# Patient Record
Sex: FEMALE | Race: OTHER | NOT HISPANIC OR LATINO | URBAN - METROPOLITAN AREA
[De-identification: names, ages, dates, MRNs, and addresses within clinical notes are randomized per-mention and may not be internally consistent; named-entity substitution may affect disease eponyms.]

---

## 2021-03-26 ENCOUNTER — EMERGENCY (EMERGENCY)
Facility: HOSPITAL | Age: 66
LOS: 1 days | Discharge: HOME | End: 2021-03-26
Attending: STUDENT IN AN ORGANIZED HEALTH CARE EDUCATION/TRAINING PROGRAM
Payer: COMMERCIAL

## 2021-03-26 VITALS
OXYGEN SATURATION: 100 % | HEIGHT: 65 IN | DIASTOLIC BLOOD PRESSURE: 74 MMHG | HEART RATE: 63 BPM | SYSTOLIC BLOOD PRESSURE: 157 MMHG | WEIGHT: 106.04 LBS | RESPIRATION RATE: 18 BRPM | TEMPERATURE: 98 F

## 2021-03-26 VITALS
HEART RATE: 63 BPM | RESPIRATION RATE: 18 BRPM | TEMPERATURE: 98 F | OXYGEN SATURATION: 98 % | DIASTOLIC BLOOD PRESSURE: 81 MMHG | SYSTOLIC BLOOD PRESSURE: 137 MMHG

## 2021-03-26 PROCEDURE — 73110 X-RAY EXAM OF WRIST: CPT

## 2021-03-26 PROCEDURE — 25605 CLTX DST RDL FX/EPHYS SEP W/: CPT | Mod: RT

## 2021-03-26 PROCEDURE — 73090 X-RAY EXAM OF FOREARM: CPT

## 2021-03-26 PROCEDURE — 99285 EMERGENCY DEPT VISIT HI MDM: CPT | Mod: 25

## 2021-03-26 PROCEDURE — 73110 X-RAY EXAM OF WRIST: CPT | Mod: 26,RT

## 2021-03-26 PROCEDURE — 99284 EMERGENCY DEPT VISIT MOD MDM: CPT

## 2021-03-26 PROCEDURE — 73130 X-RAY EXAM OF HAND: CPT | Mod: 26,RT

## 2021-03-26 PROCEDURE — 73090 X-RAY EXAM OF FOREARM: CPT | Mod: 26,LT

## 2021-03-26 PROCEDURE — 73130 X-RAY EXAM OF HAND: CPT

## 2021-03-26 RX ORDER — LIDOCAINE HYDROCHLORIDE AND EPINEPHRINE 10; 10 MG/ML; UG/ML
5 INJECTION, SOLUTION INFILTRATION; PERINEURAL ONCE
Refills: 0 | Status: COMPLETED | OUTPATIENT
Start: 2021-03-26 | End: 2021-03-26

## 2021-03-26 RX ADMIN — LIDOCAINE HYDROCHLORIDE AND EPINEPHRINE 5 MILLILITER(S): 10; 10 INJECTION, SOLUTION INFILTRATION; PERINEURAL at 23:35

## 2021-03-26 NOTE — ED PROVIDER NOTE - PATIENT PORTAL LINK FT
You can access the FollowMyHealth Patient Portal offered by [facility] by registering at the following website: [email or web address]. By joining [facility]’s FollowMyHealth portal, you will also be able to view your health information using other applications (apps) compatible with our system.

## 2021-03-26 NOTE — ED PROVIDER NOTE - OBJECTIVE STATEMENT
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 decided to come to the er due to persistent wrist pain.

## 2021-03-26 NOTE — ED PROVIDER NOTE - PHYSICAL EXAMINATION
I have reviewed the triage vital signs.  Const: Well-nourished, Well-developed, appearing stated age  Head: atraumatic, normocephalic  Eyes: no conjunctival injection and no scleral icterus  ENMT: Atraumatic external nose and ears, Moist mucus membranes  Neck: Symmetric, trachea midline,  CVS: +S1/S2, dorsalis pedis/radial pulse 2+ bilaterally, intact capillary refill in all 10 fingers,   RESP: Unlabored respiratory effort, Clear to auscultation bilaterally  GI: Nontender/Nondistended, soft abdomen  MSK: swelling over the R wrist, normal elbow flexion and extension, ambulatory in the department no pain in the b/l lower extremities,   Skin: Warm, Dry w/ no rashes bruising over the R wrist   Neuro: GCS=15, pain and swelling in the R wrist,  Psych: Awake, Alert, & Orientedx3;  Appropriate mood and affect, cooperative

## 2021-03-26 NOTE — ED PROVIDER NOTE - NSFOLLOWUPINSTRUCTIONS_ED_ALL_ED_FT
We evaluated you in the emergency room and it appears that you have a distal radius fracture.     We recommend that you rest, ice and take tylenol(650 mg up to three times a day)/motrin(600 mg up to three times a day) for your symptoms.       If you develop numbness, weakness, change in color of your extremities (turn blue or white), worsening pain please seek immediate medical care for repeat evaluation.

## 2021-03-26 NOTE — CONSULT NOTE ADULT - SUBJECTIVE AND OBJECTIVE BOX
66yFemale c/o R wrist pain s/p mechanical fall forward while walking up stairs today. Patient denies head hit or LOC. Patient denies numbness or tingling in the RUE. Patient denies any other injuries.    PMH:    PSH:    AH:    Meds: See med rec    T(C): 36.7 (03-26-21 @ 21:12)  HR: 67 (03-26-21 @ 21:12)  BP: 164/85 (03-26-21 @ 21:12)  RR: 18 (03-26-21 @ 21:12)  SpO2: 100% (03-26-21 @ 21:12)  Wt(kg): --    Gen: NAD  PE RUE:  Skin intact,   visible deformity of wrist,   SILT med/rad/ulnar/axillary  +AIN/PIN/Ulnar/Radial/Musc/Median,   +shoulder/elbow ROM,   wrist ROM limited 2/2 pain,   +radial pulse, soft compartments,    Secondary:  No TTP over bony landmarks, SILT BL, ROM intact BL, distal pulses palpable.    Imaging:  XR demonstrating R distal radius fracture    Procedure:  Under aseptic conditions, a hematoma block was administered to the fracture site using 10cc of 1% lidocaine. Closed reduction was performed and a well molded plaster splint was applied. The patient tolerated the procedure well and there we no complications. The patient was neurovascularly intact following reduction. Post-reduction xrays demonstrated acceptable alignment.      A/P: 66 yoF  s/p reduction of R distal radius fracture    - Pain control  - NWB RUE in splint  - Splint precautions  - Possible need for future surgery explained to the patient  - Fu with Dr. Covarrubias on Monday. Please call office to schedule an appointment. [phone]

## 2021-03-26 NOTE — ED PROVIDER NOTE - CARE PLAN
Principal Discharge DX:	Closed fracture of distal end of right radius, unspecified fracture morphology, initial encounter

## 2021-03-26 NOTE — ED ADULT NURSE NOTE - OBJECTIVE STATEMENT
66 y F presents to the ED after tripping on the stairs at work today. Denies LOC, hitting her head or taking blood thinners. Reports pain in her R wrist, mild swelling noted in the wrist. Normal sensation throughout. Able to move wrist but reports pain. declines pain medication at this time. Pt given ice pack. On assessment, A&Ox4. Denies lightheadedness, dizziness, headaches, numbness and tingling. Breathing spontaneously and unlabored. Sating 100% on Room air. Pulses intact b/l. Pt safety maintained. Call bell within reach. Side rails in upward position. Seen and evaluated by MD. Awaiting dispo.

## 2021-03-26 NOTE — ED PROVIDER NOTE - CLINICAL SUMMARY MEDICAL DECISION MAKING FREE TEXT BOX
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 no numbness in the fingers, no cp, no sob, no head strike,  deformity of the R wrist, 2+ radial pulse, intact flexion and extension of the fingers,  will have xray suspect distal radius fracture  xray

## 2021-03-26 NOTE — ED PROVIDER NOTE - PROGRESS NOTE DETAILS
orthopedics consulted splinted and reduced by orthopedics, s/p splint, intact cap refill in the fingers, intact rom of the fingers

## 2021-03-26 NOTE — ED PROVIDER NOTE - CARE PROVIDER_API CALL
Jose Carlos Covarrubias  ORTHOPAEDIC SURGERY  [address] [place] [address], [address]  [place] [address]  Phone: [phone]  Fax: [phone]  Follow Up Time: 4-6 Days

## 2021-03-26 NOTE — ED PROVIDER NOTE - NS ED ROS FT
Constitutional: no fevers no chills.   CV: no chest pain, no palpitations   Respiratory: no shortness of breath, no cough   GI: no abdominal pain, no nausea no vomiting   Neuro: no headache, no weakness, no numbness  MSK: + wrist pain R side  all other ROS is negative except as documented as HPI.

## 2024-05-18 ENCOUNTER — INPATIENT (INPATIENT)
Facility: HOSPITAL | Age: 69
LOS: 1 days | Discharge: HOME | DRG: 392 | End: 2024-05-20
Attending: INTERNAL MEDICINE | Admitting: INTERNAL MEDICINE
Payer: COMMERCIAL

## 2024-05-18 VITALS
WEIGHT: 110.01 LBS | HEIGHT: 60 IN | TEMPERATURE: 99 F | OXYGEN SATURATION: 100 % | HEART RATE: 75 BPM | RESPIRATION RATE: 18 BRPM | DIASTOLIC BLOOD PRESSURE: 64 MMHG | SYSTOLIC BLOOD PRESSURE: 145 MMHG

## 2024-05-18 DIAGNOSIS — K52.9 NONINFECTIVE GASTROENTERITIS AND COLITIS, UNSPECIFIED: ICD-10-CM

## 2024-05-18 LAB
ALBUMIN SERPL ELPH-MCNC: 4.2 G/DL — SIGNIFICANT CHANGE UP (ref 3.3–5)
ALP SERPL-CCNC: 79 U/L — SIGNIFICANT CHANGE UP (ref 40–120)
ALT FLD-CCNC: 19 U/L — SIGNIFICANT CHANGE UP (ref 10–45)
ANION GAP SERPL CALC-SCNC: 9 MMOL/L — SIGNIFICANT CHANGE UP (ref 5–17)
ANISOCYTOSIS BLD QL: SIGNIFICANT CHANGE UP
APPEARANCE UR: CLEAR — SIGNIFICANT CHANGE UP
APTT BLD: 31.6 SEC — SIGNIFICANT CHANGE UP (ref 24.5–35.6)
AST SERPL-CCNC: 13 U/L — SIGNIFICANT CHANGE UP (ref 10–40)
BACTERIA # UR AUTO: NEGATIVE /HPF — SIGNIFICANT CHANGE UP
BASE EXCESS BLDV CALC-SCNC: 9.2 MMOL/L — HIGH (ref -2–3)
BASOPHILS # BLD AUTO: 0 K/UL — SIGNIFICANT CHANGE UP (ref 0–0.2)
BASOPHILS NFR BLD AUTO: 0 % — SIGNIFICANT CHANGE UP (ref 0–2)
BILIRUB SERPL-MCNC: 0.4 MG/DL — SIGNIFICANT CHANGE UP (ref 0.2–1.2)
BILIRUB UR-MCNC: NEGATIVE — SIGNIFICANT CHANGE UP
BUN SERPL-MCNC: 8 MG/DL — SIGNIFICANT CHANGE UP (ref 7–23)
CA-I SERPL-SCNC: 1.24 MMOL/L — SIGNIFICANT CHANGE UP (ref 1.15–1.33)
CALCIUM SERPL-MCNC: 9.7 MG/DL — SIGNIFICANT CHANGE UP (ref 8.4–10.5)
CAST: 0 /LPF — SIGNIFICANT CHANGE UP (ref 0–4)
CHLORIDE BLDV-SCNC: 102 MMOL/L — SIGNIFICANT CHANGE UP (ref 96–108)
CHLORIDE SERPL-SCNC: 101 MMOL/L — SIGNIFICANT CHANGE UP (ref 96–108)
CO2 BLDV-SCNC: 38 MMOL/L — HIGH (ref 22–26)
CO2 SERPL-SCNC: 30 MMOL/L — SIGNIFICANT CHANGE UP (ref 22–31)
COLOR SPEC: YELLOW — SIGNIFICANT CHANGE UP
CREAT SERPL-MCNC: 0.59 MG/DL — SIGNIFICANT CHANGE UP (ref 0.5–1.3)
DACRYOCYTES BLD QL SMEAR: SIGNIFICANT CHANGE UP
DIFF PNL FLD: ABNORMAL
EGFR: 98 ML/MIN/1.73M2 — SIGNIFICANT CHANGE UP
EOSINOPHIL # BLD AUTO: 0 K/UL — SIGNIFICANT CHANGE UP (ref 0–0.5)
EOSINOPHIL NFR BLD AUTO: 0 % — SIGNIFICANT CHANGE UP (ref 0–6)
GAS PNL BLDV: 136 MMOL/L — SIGNIFICANT CHANGE UP (ref 136–145)
GAS PNL BLDV: SIGNIFICANT CHANGE UP
GIANT PLATELETS BLD QL SMEAR: PRESENT — SIGNIFICANT CHANGE UP
GLUCOSE BLDV-MCNC: 87 MG/DL — SIGNIFICANT CHANGE UP (ref 70–99)
GLUCOSE SERPL-MCNC: 94 MG/DL — SIGNIFICANT CHANGE UP (ref 70–99)
GLUCOSE UR QL: NEGATIVE MG/DL — SIGNIFICANT CHANGE UP
HCO3 BLDV-SCNC: 36 MMOL/L — HIGH (ref 22–29)
HCT VFR BLD CALC: 31.1 % — LOW (ref 34.5–45)
HCT VFR BLDA CALC: 30 % — LOW (ref 34.5–46.5)
HGB BLD CALC-MCNC: 10 G/DL — LOW (ref 11.7–16.1)
HGB BLD-MCNC: 9.6 G/DL — LOW (ref 11.5–15.5)
INR BLD: 1.04 RATIO — SIGNIFICANT CHANGE UP (ref 0.85–1.18)
KETONES UR-MCNC: NEGATIVE MG/DL — SIGNIFICANT CHANGE UP
LACTATE BLDV-MCNC: 1.3 MMOL/L — SIGNIFICANT CHANGE UP (ref 0.5–2)
LEUKOCYTE ESTERASE UR-ACNC: NEGATIVE — SIGNIFICANT CHANGE UP
LIDOCAIN IGE QN: 54 U/L — SIGNIFICANT CHANGE UP (ref 7–60)
LYMPHOCYTES # BLD AUTO: 1.59 K/UL — SIGNIFICANT CHANGE UP (ref 1–3.3)
LYMPHOCYTES # BLD AUTO: 15.7 % — SIGNIFICANT CHANGE UP (ref 13–44)
MANUAL SMEAR VERIFICATION: SIGNIFICANT CHANGE UP
MCHC RBC-ENTMCNC: 20.3 PG — LOW (ref 27–34)
MCHC RBC-ENTMCNC: 30.9 GM/DL — LOW (ref 32–36)
MCV RBC AUTO: 65.9 FL — LOW (ref 80–100)
MICROCYTES BLD QL: SIGNIFICANT CHANGE UP
MONOCYTES # BLD AUTO: 0.17 K/UL — SIGNIFICANT CHANGE UP (ref 0–0.9)
MONOCYTES NFR BLD AUTO: 1.7 % — LOW (ref 2–14)
NEUTROPHILS # BLD AUTO: 8.36 K/UL — HIGH (ref 1.8–7.4)
NEUTROPHILS NFR BLD AUTO: 82.6 % — HIGH (ref 43–77)
NITRITE UR-MCNC: NEGATIVE — SIGNIFICANT CHANGE UP
OB PNL STL: POSITIVE
OVALOCYTES BLD QL SMEAR: SIGNIFICANT CHANGE UP
PCO2 BLDV: 59 MMHG — HIGH (ref 39–42)
PH BLDV: 7.39 — SIGNIFICANT CHANGE UP (ref 7.32–7.43)
PH UR: 7.5 — SIGNIFICANT CHANGE UP (ref 5–8)
PLAT MORPH BLD: NORMAL — SIGNIFICANT CHANGE UP
PLATELET # BLD AUTO: 127 K/UL — LOW (ref 150–400)
PO2 BLDV: 27 MMHG — SIGNIFICANT CHANGE UP (ref 25–45)
POIKILOCYTOSIS BLD QL AUTO: SIGNIFICANT CHANGE UP
POLYCHROMASIA BLD QL SMEAR: SLIGHT — SIGNIFICANT CHANGE UP
POTASSIUM BLDV-SCNC: 4 MMOL/L — SIGNIFICANT CHANGE UP (ref 3.5–5.1)
POTASSIUM SERPL-MCNC: 3.6 MMOL/L — SIGNIFICANT CHANGE UP (ref 3.5–5.3)
POTASSIUM SERPL-SCNC: 3.6 MMOL/L — SIGNIFICANT CHANGE UP (ref 3.5–5.3)
PROT SERPL-MCNC: 7.1 G/DL — SIGNIFICANT CHANGE UP (ref 6–8.3)
PROT UR-MCNC: NEGATIVE MG/DL — SIGNIFICANT CHANGE UP
PROTHROM AB SERPL-ACNC: 10.9 SEC — SIGNIFICANT CHANGE UP (ref 9.5–13)
RBC # BLD: 4.72 M/UL — SIGNIFICANT CHANGE UP (ref 3.8–5.2)
RBC # FLD: 16.7 % — HIGH (ref 10.3–14.5)
RBC BLD AUTO: ABNORMAL
RBC CASTS # UR COMP ASSIST: 1 /HPF — SIGNIFICANT CHANGE UP (ref 0–4)
REVIEW: SIGNIFICANT CHANGE UP
SAO2 % BLDV: 39.2 % — LOW (ref 67–88)
SCHISTOCYTES BLD QL AUTO: SLIGHT — SIGNIFICANT CHANGE UP
SODIUM SERPL-SCNC: 140 MMOL/L — SIGNIFICANT CHANGE UP (ref 135–145)
SP GR SPEC: 1.01 — SIGNIFICANT CHANGE UP (ref 1–1.03)
SQUAMOUS # UR AUTO: 1 /HPF — SIGNIFICANT CHANGE UP (ref 0–5)
TARGETS BLD QL SMEAR: SLIGHT — SIGNIFICANT CHANGE UP
UROBILINOGEN FLD QL: 0.2 MG/DL — SIGNIFICANT CHANGE UP (ref 0.2–1)
WBC # BLD: 10.12 K/UL — SIGNIFICANT CHANGE UP (ref 3.8–10.5)
WBC # FLD AUTO: 10.12 K/UL — SIGNIFICANT CHANGE UP (ref 3.8–10.5)
WBC UR QL: 0 /HPF — SIGNIFICANT CHANGE UP (ref 0–5)

## 2024-05-18 PROCEDURE — 99285 EMERGENCY DEPT VISIT HI MDM: CPT

## 2024-05-18 PROCEDURE — 74177 CT ABD & PELVIS W/CONTRAST: CPT | Mod: 26,MC

## 2024-05-18 RX ORDER — CIPROFLOXACIN LACTATE 400MG/40ML
400 VIAL (ML) INTRAVENOUS EVERY 12 HOURS
Refills: 0 | Status: DISCONTINUED | OUTPATIENT
Start: 2024-05-18 | End: 2024-05-20

## 2024-05-18 RX ORDER — ENALAPRIL MALEATE AND HYDROCHLOROTHIAZIDE 10; 25 MG/1; MG/1
1 TABLET ORAL
Refills: 0 | DISCHARGE

## 2024-05-18 RX ORDER — LEVOTHYROXINE SODIUM 125 MCG
0.5 TABLET ORAL
Refills: 0 | DISCHARGE

## 2024-05-18 RX ORDER — METRONIDAZOLE 500 MG
500 TABLET ORAL ONCE
Refills: 0 | Status: COMPLETED | OUTPATIENT
Start: 2024-05-18 | End: 2024-05-18

## 2024-05-18 RX ORDER — METRONIDAZOLE 500 MG
500 TABLET ORAL EVERY 8 HOURS
Refills: 0 | Status: DISCONTINUED | OUTPATIENT
Start: 2024-05-18 | End: 2024-05-20

## 2024-05-18 RX ORDER — CIPROFLOXACIN LACTATE 400MG/40ML
400 VIAL (ML) INTRAVENOUS ONCE
Refills: 0 | Status: COMPLETED | OUTPATIENT
Start: 2024-05-18 | End: 2024-05-18

## 2024-05-18 RX ADMIN — Medication 200 MILLIGRAM(S): at 18:13

## 2024-05-18 RX ADMIN — Medication 100 MILLIGRAM(S): at 22:05

## 2024-05-18 RX ADMIN — Medication 100 MILLIGRAM(S): at 12:17

## 2024-05-18 RX ADMIN — Medication 200 MILLIGRAM(S): at 13:08

## 2024-05-18 NOTE — ED PROVIDER NOTE - CLINICAL SUMMARY MEDICAL DECISION MAKING FREE TEXT BOX
Adult female presents with 3 days worth of crampy abdominal pain now with blood in stool.  Concerns for diverticulosis/diverticulitis plan check labs CT abdomen IV fluids and reassess.

## 2024-05-18 NOTE — ED ADULT NURSE NOTE - NSFALLUNIVINTERV_ED_ALL_ED
Bed/Stretcher in lowest position, wheels locked, appropriate side rails in place/Call bell, personal items and telephone in reach/Instruct patient to call for assistance before getting out of bed/chair/stretcher/Non-slip footwear applied when patient is off stretcher/[place] to call system/Physically safe environment - no spills, clutter or unnecessary equipment/Purposeful proactive rounding/Room/bathroom lighting operational, light cord in reach

## 2024-05-18 NOTE — ED ADULT NURSE NOTE - NSFALLRISKASMTTYPE_ED_ALL_ED
FESS Initial (On Arrival) Area H Indication Text: Tumors in this location are included in Area H (eyelids, eyebrows, nose, lips, chin, ear, pre-auricular, post-auricular, temple, genitalia, hands, feet, ankles and areola).  Tissue conservation is critical in these anatomic locations.

## 2024-05-18 NOTE — ED ADULT TRIAGE NOTE - CHIEF COMPLAINT QUOTE
"on Wednesday I had diarrhea with blood on my stool and today I noticed blood again when I went to the bathroom"

## 2024-05-18 NOTE — H&P ADULT - HISTORY OF PRESENT ILLNESS
69y f pmh HTN, HLD, Hypothyrodism, Pt comes to ed c/o Diarrhea several episodes 3d ago. With mild abd cramps. Had improved and today had formed bm today w blood on stool. No blood in bowl. No fever, nvdc, cough,sob,cp, no ill contacts,habits, abd surgery. Had colonoscopy 10y ago reported normal. No ill contacts.

## 2024-05-18 NOTE — ED ADULT NURSE REASSESSMENT NOTE - NS ED NURSE REASSESS COMMENT FT1
Report received from Lizzeth GARCIA. Pt AXOX4 breathing unlabored on room air and speaking in complete sentences. Pt updated on plan of care; awaiting bed. Safety and comfort measures maintained.

## 2024-05-18 NOTE — ED PROVIDER NOTE - PHYSICAL EXAMINATION
Adult female awake alert NAD   HEENT normocephalic atraumatic.  Chest clear AP.  Cardiovascular no rubs gallops murmurs.  Abdomen soft nontender no mass no guarding or rebound no CVA tenderness.  Neuro no focal defects.

## 2024-05-18 NOTE — ED PROVIDER NOTE - ATTENDING APP SHARED VISIT CONTRIBUTION OF CARE
PMS Deb Park PCP/Carlos Enrique  69y f pmh HTN, HLD, Hypothyrodism, Pt comes to ed c/o Diarrhea several episodes 3d ago. With mild abd cramps. Had improved and today had formed bm today w blood on stool. No blood in bowl. No fever, nvdc, cough,sob,cp, no ill contacts,habits, abd surgery. Had colonoscopy 10y ago reported normal. No ill contacts. PE

## 2024-05-18 NOTE — ED PROVIDER NOTE - PROGRESS NOTE DETAILS
RECTAL: +External and internal hemorrhoids. Non thrombosed. Normal rectal tone. Small amount of bright red blood. Chaperoned by PCA Irbin. Saleem Mattson PA-C Low suspicion for ischemic colitis, pt with benign abdomen. Lactate WNL. No hx of Afib. States pain under better control currently. Will admit for GI cs and cntd management. Cipro/flagyl ordered. Saleem Mattson PA-C

## 2024-05-18 NOTE — H&P ADULT - NSHPLABSRESULTS_GEN_ALL_CORE
LABS:                        9.6    10.12 )-----------( 127      ( 18 May 2024 10:01 )             31.1     05-    140  |  101  |  8   ----------------------------<  94  3.6   |  30  |  0.59    Ca    9.7      18 May 2024 10:01    TPro  7.1  /  Alb  4.2  /  TBili  0.4  /  DBili  x   /  AST  13  /  ALT  19  /  AlkPhos  79  05-18    PT/INR - ( 18 May 2024 10:01 )   PT: 10.9 sec;   INR: 1.04 ratio         PTT - ( 18 May 2024 10:01 )  PTT:31.6 sec  Urinalysis Basic - ( 18 May 2024 11:17 )    Color: Yellow / Appearance: Clear / S.012 / pH: x  Gluc: x / Ketone: Negative mg/dL  / Bili: Negative / Urobili: 0.2 mg/dL   Blood: x / Protein: Negative mg/dL / Nitrite: Negative   Leuk Esterase: Negative / RBC: 1 /HPF / WBC 0 /HPF   Sq Epi: x / Non Sq Epi: 1 /HPF / Bacteria: Negative /HPF            RADIOLOGY & ADDITIONAL TESTS:

## 2024-05-18 NOTE — H&P ADULT - ASSESSMENT
69y f pmh HTN, HLD, Hypothyrodism, Pt comes to ed c/o Diarrhea several episodes 3d ago. With mild abd cramps. Had improved and today had formed bm today w blood on stool. No blood in bowl. No fever, nvdc, cough,sob,cp, no ill contacts,habits, abd surgery. Had colonoscopy 10y ago reported normal. No ill contacts.    colitis infectious vs ischemic  - regular diet  - stool for GI PCR  -  cipro and flagyl  - GI consult    HTN  - c/w home meds    hypothyroid  - c/w synthroid  - check TSH

## 2024-05-18 NOTE — ED ADULT NURSE NOTE - OBJECTIVE STATEMENT
Pt came in c/o blood in the stool after having diarrhea for a few days. Pt is in no distress. Breathing easy and non labored. Pt is ambulatory. Pt is alert and orientedX4. Pt is calm and cooperative.

## 2024-05-18 NOTE — ED ADULT NURSE REASSESSMENT NOTE - NS ED NURSE REASSESS COMMENT FT1
Report received from Alessia GARCIA. Pt A&Ox4, in no acute distress at time. Family remains at bedside. Patient awaiting bed assignment. Patient safety maintained, bed is in lowest position, wheels locked, and side rails raised.

## 2024-05-18 NOTE — ED PROVIDER NOTE - OBJECTIVE STATEMENT
PMS Deb Park PCP/Carlos Enrique  69y f pmh HTN, HLD, Hypothyrodism, Pt comes to ed c/o Diarrhea several episodes 3d ago. With mild abd cramps. Had improved and today had formed bm today w blood on stool. No blood in bowl. No fever, nvdc, cough,sob,cp, no ill contacts,habits, abd surgery. Had colonoscopy 10y ago reported normal. No ill contacts.

## 2024-05-19 LAB
ANION GAP SERPL CALC-SCNC: 15 MMOL/L — SIGNIFICANT CHANGE UP (ref 5–17)
BUN SERPL-MCNC: 13 MG/DL — SIGNIFICANT CHANGE UP (ref 7–23)
CALCIUM SERPL-MCNC: 9.2 MG/DL — SIGNIFICANT CHANGE UP (ref 8.4–10.5)
CHLORIDE SERPL-SCNC: 104 MMOL/L — SIGNIFICANT CHANGE UP (ref 96–108)
CO2 SERPL-SCNC: 24 MMOL/L — SIGNIFICANT CHANGE UP (ref 22–31)
CREAT SERPL-MCNC: 0.64 MG/DL — SIGNIFICANT CHANGE UP (ref 0.5–1.3)
CULTURE RESULTS: SIGNIFICANT CHANGE UP
EGFR: 96 ML/MIN/1.73M2 — SIGNIFICANT CHANGE UP
FOLATE SERPL-MCNC: 11.3 NG/ML — SIGNIFICANT CHANGE UP
GI PCR PANEL: SIGNIFICANT CHANGE UP
GLUCOSE SERPL-MCNC: 99 MG/DL — SIGNIFICANT CHANGE UP (ref 70–99)
HCT VFR BLD CALC: 31.5 % — LOW (ref 34.5–45)
HGB BLD-MCNC: 9.6 G/DL — LOW (ref 11.5–15.5)
MAGNESIUM SERPL-MCNC: 2 MG/DL — SIGNIFICANT CHANGE UP (ref 1.6–2.6)
MCHC RBC-ENTMCNC: 20.4 PG — LOW (ref 27–34)
MCHC RBC-ENTMCNC: 30.5 GM/DL — LOW (ref 32–36)
MCV RBC AUTO: 66.9 FL — LOW (ref 80–100)
NRBC # BLD: 0 /100 WBCS — SIGNIFICANT CHANGE UP (ref 0–0)
PHOSPHATE SERPL-MCNC: 3.5 MG/DL — SIGNIFICANT CHANGE UP (ref 2.5–4.5)
PLATELET # BLD AUTO: 120 K/UL — LOW (ref 150–400)
POTASSIUM SERPL-MCNC: 3.5 MMOL/L — SIGNIFICANT CHANGE UP (ref 3.5–5.3)
POTASSIUM SERPL-SCNC: 3.5 MMOL/L — SIGNIFICANT CHANGE UP (ref 3.5–5.3)
RBC # BLD: 4.71 M/UL — SIGNIFICANT CHANGE UP (ref 3.8–5.2)
RBC # FLD: 16.9 % — HIGH (ref 10.3–14.5)
SODIUM SERPL-SCNC: 143 MMOL/L — SIGNIFICANT CHANGE UP (ref 135–145)
SPECIMEN SOURCE: SIGNIFICANT CHANGE UP
TSH SERPL-MCNC: 6.05 UIU/ML — HIGH (ref 0.27–4.2)
VIT B12 SERPL-MCNC: 433 PG/ML — SIGNIFICANT CHANGE UP (ref 232–1245)
WBC # BLD: 6.08 K/UL — SIGNIFICANT CHANGE UP (ref 3.8–10.5)
WBC # FLD AUTO: 6.08 K/UL — SIGNIFICANT CHANGE UP (ref 3.8–10.5)

## 2024-05-19 RX ORDER — LATANOPROST 0.05 MG/ML
1 SOLUTION/ DROPS OPHTHALMIC; TOPICAL AT BEDTIME
Refills: 0 | Status: DISCONTINUED | OUTPATIENT
Start: 2024-05-19 | End: 2024-05-20

## 2024-05-19 RX ORDER — LEVOTHYROXINE SODIUM 125 MCG
37.5 TABLET ORAL DAILY
Refills: 0 | Status: DISCONTINUED | OUTPATIENT
Start: 2024-05-19 | End: 2024-05-20

## 2024-05-19 RX ORDER — LACTOBACILLUS ACIDOPHILUS 100MM CELL
1 CAPSULE ORAL
Refills: 0 | Status: DISCONTINUED | OUTPATIENT
Start: 2024-05-19 | End: 2024-05-20

## 2024-05-19 RX ORDER — SIMVASTATIN 20 MG/1
10 TABLET, FILM COATED ORAL AT BEDTIME
Refills: 0 | Status: DISCONTINUED | OUTPATIENT
Start: 2024-05-19 | End: 2024-05-20

## 2024-05-19 RX ADMIN — LATANOPROST 1 DROP(S): 0.05 SOLUTION/ DROPS OPHTHALMIC; TOPICAL at 21:19

## 2024-05-19 RX ADMIN — Medication 37.5 MICROGRAM(S): at 05:49

## 2024-05-19 RX ADMIN — Medication 1 TABLET(S): at 18:54

## 2024-05-19 RX ADMIN — Medication 100 MILLIGRAM(S): at 05:02

## 2024-05-19 RX ADMIN — Medication 200 MILLIGRAM(S): at 05:09

## 2024-05-19 RX ADMIN — Medication 100 MILLIGRAM(S): at 21:23

## 2024-05-19 RX ADMIN — Medication 100 MILLIGRAM(S): at 14:11

## 2024-05-19 RX ADMIN — Medication 200 MILLIGRAM(S): at 18:54

## 2024-05-19 RX ADMIN — SIMVASTATIN 10 MILLIGRAM(S): 20 TABLET, FILM COATED ORAL at 21:19

## 2024-05-19 NOTE — PROGRESS NOTE ADULT - ASSESSMENT
69y f pmh HTN, HLD, Hypothyrodism, Pt comes to ed c/o Diarrhea several episodes 3d ago. With mild abd cramps. Had improved and today had formed bm today w blood on stool. No blood in bowl. No fever, nvdc, cough,sob,cp, no ill contacts,habits, abd surgery. Had colonoscopy 10y ago reported normal. No ill contacts.    colitis infectious vs ischemic  - regular diet  - stool for GI PCR  -  cipro and flagyl  - GI consult    anemia  - iron studies    HTN  - c/w home meds    hypothyroid  - c/w synthroid  - check TSH

## 2024-05-19 NOTE — CONSULT NOTE ADULT - SUBJECTIVE AND OBJECTIVE BOX
[facility]  Wander Baldwin PA-C  [address] [place] [address]  [phone]      Chief Complaint:  Patient is a 69y old  Female who presents with a chief complaint of bloody stool (19 May 2024 10:05)      HPI:  69y f pmh HTN, HLD, Hypothyrodism, Pt comes to ed c/o Diarrhea several episodes 3d ago. With mild abd cramps. Had improved and today had formed bm today w blood on stool. No blood in bowl. No fever, nvdc, cough,sob,cp, no ill contacts,habits, abd surgery. Had colonoscopy 10y ago reported normal. No ill contacts. (18 May 2024 15:05)    GI consulted for bloody stool and diarrhea. Pt seen and examined in ER. She reports diarrhea is better. Prior to coming to ER she reports having multiple loose watery BMs and had blood in stool,that made her nervous and presented to ED.  She had formed BM earlier today mixed with bright red blood (as per pt. less blood seen mixed with stool today). She reports having history of hemorrhoids.  She denies having any abdominal pain or cramps, denies having fever. Tolerating PO intake no nausea, vomiting reported.  She had colonoscopy >10 years ago by Dr. Mantilla.     Allergies    No Known Allergies    Intolerances    MEDICATIONS  (STANDING):  ciprofloxacin   IVPB 400 milliGRAM(s) IV Intermittent every 12 hours  latanoprost 0.005% Ophthalmic Solution 1 Drop(s) Both EYES at bedtime  levothyroxine 37.5 MICROGram(s) Oral daily  metroNIDAZOLE  IVPB 500 milliGRAM(s) IV Intermittent every 8 hours  simvastatin 10 milliGRAM(s) Oral at bedtime    MEDICATIONS  (PRN):      PAST MEDICAL & SURGICAL HISTORY:  HTN (hypertension)      HLD (hyperlipidemia)      Hypothyroidism      No significant past surgical history    FAMILY HISTORY:  colon cancer for brother     Social History:  no tob  no etoh  lives with  (18 May 2024 15:05)      ROS:     General:  no fevers, chills, night sweats, fatigue,   Eyes:  Good vision, no reported pain  ENT:  No sore throat, pain, runny nose, dysphagia  CV:  No pain, palpitations, hypo/hypertension  Resp:  No dyspnea, cough, tachypnea, wheezing  GI:  No pain, No nausea, No vomiting, +diarrhea, constipation, No weight loss, No fever, No pruritis, +rectal bleeding, No tarry stools, No dysphagia,  :  No pain, bleeding, incontinence, nocturia  Muscle:  No pain, weakness  Neuro:  No weakness, tingling, memory problems  Psych:  No fatigue, insomnia, mood problems, depression  Endocrine:  No polyuria, polydipsia, cold/heat intolerance  Heme:  No petechiae, ecchymosis, easy bruisability  Skin:  No rash, tattoos, scars, edema    PHYSICAL EXAM:  Vital Signs Last 24 Hrs  T(C): 37.1 (18 May 2024 21:18), Max: 37.1 (18 May 2024 21:18)  T(F): 98.8 (18 May 2024 21:18), Max: 98.8 (18 May 2024 21:18)  HR: 64 (18 May 2024 21:18) (64 - 66)  BP: 101/67 (18 May 2024 21:18) (101/67 - 117/68)  BP(mean): 86 (18 May 2024 18:30) (84 - 86)  RR: 18 (18 May 2024 21:18) (18 - 18)  SpO2: 96% (18 May 2024 21:18) (95% - 100%)    Parameters below as of 18 May 2024 21:18  Patient On (Oxygen Delivery Method): room air      Daily     Daily   Daily Height  Daily weight     GENERAL:  Appears stated age,   HEENT:  NC/AT,    CHEST:  Full & symmetric excursion,   HEART:  Regular rhythm  ABDOMEN:  Soft, non-tender, non-distended,   EXTEREMITIES:  no cyanosis,clubbing or edema  SKIN:  No rash  NEURO:  Alert,    LABS:                        9.6    6.08  )-----------( 120      ( 19 May 2024 07:22 )             31.5   05-19    143  |  104  |  13  ----------------------------<  99  3.5   |  24  |  0.64    Ca    9.2      19 May 2024 07:22  Phos  3.5     05-19  Mg     2.0     05-19    TPro  7.1  /  Alb  4.2  /  TBili  0.4  /  DBili  x   /  AST  13  /  ALT  19  /  AlkPhos  79  05-18  LIVER FUNCTIONS - ( 18 May 2024 10:01 )  Alb: 4.2 g/dL / Pro: 7.1 g/dL / ALK PHOS: 79 U/L / ALT: 19 U/L / AST: 13 U/L / GGT: x                    PT/INR - ( 18 May 2024 10:01 )   PT: 10.9 sec;   INR: 1.04 ratio         PTT - ( 18 May 2024 10:01 )  PTT:31.6 sec      Urinalysis Basic   Urinalysis Basic - ( 19 May 2024 07:22 )    Color: x / Appearance: x / SG: x / pH: x  Gluc: 99 mg/dL / Ketone: x  / Bili: x / Urobili: x   Blood: x / Protein: x / Nitrite: x   Leuk Esterase: x / RBC: x / WBC x   Sq Epi: x / Non Sq Epi: x / Bacteria: x      Imaging:  < from: CT Abdomen and Pelvis w/ IV Cont (05.18.24 @ 10:05) >    ACC: [id number] EXAM:  CT ABDOMEN AND PELVIS IC   ORDERED BY:  DARRYN EATON     PROCEDURE DATE:  05/18/2024          INTERPRETATION:  CLINICAL INFORMATION: Diarrhea with blood in the stool    COMPARISON: None.    CONTRAST/COMPLICATIONS:  IV Contrast: Omnipaque 350  90 cc administered   10 cc discarded  Oral Contrast: NONE  Complications: None reported at time of study completion    PROCEDURE:  CT of the Abdomen and Pelvis was performed.  Sagittal and coronal reformats were performed.    FINDINGS:  LOWER CHEST: Mild bibasilar dependent atelectasis.    LIVER: Steatosis.  BILE DUCTS: Normal caliber.  GALLBLADDER: Hyperenhancing gallbladder wall. Focal proximal narrowing.  SPLEEN: Within normal limits.  PANCREAS: Within normal limits.  ADRENALS: Within normal limits.  KIDNEYS/URETERS: Within normal limits.    BLADDER: Within normal limits.  REPRODUCTIVE ORGANS: Uterus and adnexa within normal limits.    BOWEL: No bowel obstruction. Appendix is normal. Wall thickening and   surroundingfat stranding involving the proximal descending colon.  PERITONEUM: No ascites.  VESSELS: Atherosclerotic changes. SMA and SMV are patent.  RETROPERITONEUM/LYMPH NODES: No lymphadenopathy.  ABDOMINAL WALL: Within normal limits.  BONES: Mild degenerative changes.    IMPRESSION:  Colitis involving the proximal descending colon. Nonspecific, may be   ischemic in etiology due to distribution involving a watershed region.    Hyperenhancing gallbladder wall. Consider further evaluation with right   upper quadrant ultrasound if clinically warranted.        --- End of Report ---           JOSE LUIS SANTANA MD; Resident Radiologist  This document has been electronically signed.  SHAHRZAD RAGSDALE MD; Attending Radiologist  This document has been electronically signed. May 18 2024 10:56AM    < end of copied text >

## 2024-05-19 NOTE — CONSULT NOTE ADULT - ASSESSMENT
Diarrhea  Bloody stool  +FOBT    Monitor CBC  cont abx  bacid 1 tab po tid  follow up stool studies results pending  monitor stool consistency/frequency   low residue, lactose free diet as tolerated   colonoscopy as oupt in 4 to 6 weeks  will follow

## 2024-05-19 NOTE — PATIENT PROFILE ADULT - FALL HARM RISK - UNIVERSAL INTERVENTIONS
Bed in lowest position, wheels locked, appropriate side rails in place/Call bell, personal items and telephone in reach/Instruct patient to call for assistance before getting out of bed or chair/Non-slip footwear when patient is out of bed/[place] to call system/Physically safe environment - no spills, clutter or unnecessary equipment/Purposeful Proactive Rounding/Room/bathroom lighting operational, light cord in reach

## 2024-05-20 ENCOUNTER — TRANSCRIPTION ENCOUNTER (OUTPATIENT)
Age: 69
End: 2024-05-20

## 2024-05-20 VITALS
OXYGEN SATURATION: 100 % | TEMPERATURE: 98 F | HEART RATE: 57 BPM | DIASTOLIC BLOOD PRESSURE: 79 MMHG | RESPIRATION RATE: 18 BRPM | SYSTOLIC BLOOD PRESSURE: 130 MMHG

## 2024-05-20 LAB
ANION GAP SERPL CALC-SCNC: 11 MMOL/L — SIGNIFICANT CHANGE UP (ref 5–17)
BUN SERPL-MCNC: 15 MG/DL — SIGNIFICANT CHANGE UP (ref 7–23)
CALCIUM SERPL-MCNC: 9 MG/DL — SIGNIFICANT CHANGE UP (ref 8.4–10.5)
CHLORIDE SERPL-SCNC: 103 MMOL/L — SIGNIFICANT CHANGE UP (ref 96–108)
CO2 SERPL-SCNC: 24 MMOL/L — SIGNIFICANT CHANGE UP (ref 22–31)
CREAT SERPL-MCNC: 0.59 MG/DL — SIGNIFICANT CHANGE UP (ref 0.5–1.3)
EGFR: 98 ML/MIN/1.73M2 — SIGNIFICANT CHANGE UP
FERRITIN SERPL-MCNC: 262 NG/ML — SIGNIFICANT CHANGE UP (ref 13–330)
GLUCOSE SERPL-MCNC: 91 MG/DL — SIGNIFICANT CHANGE UP (ref 70–99)
HCT VFR BLD CALC: 29 % — LOW (ref 34.5–45)
HGB BLD-MCNC: 9 G/DL — LOW (ref 11.5–15.5)
IRON SATN MFR SERPL: 29 % — SIGNIFICANT CHANGE UP (ref 14–50)
IRON SATN MFR SERPL: 89 UG/DL — SIGNIFICANT CHANGE UP (ref 30–160)
MCHC RBC-ENTMCNC: 20.4 PG — LOW (ref 27–34)
MCHC RBC-ENTMCNC: 31 GM/DL — LOW (ref 32–36)
MCV RBC AUTO: 65.6 FL — LOW (ref 80–100)
NRBC # BLD: 0 /100 WBCS — SIGNIFICANT CHANGE UP (ref 0–0)
PLATELET # BLD AUTO: 116 K/UL — LOW (ref 150–400)
POTASSIUM SERPL-MCNC: 3.8 MMOL/L — SIGNIFICANT CHANGE UP (ref 3.5–5.3)
POTASSIUM SERPL-SCNC: 3.8 MMOL/L — SIGNIFICANT CHANGE UP (ref 3.5–5.3)
RBC # BLD: 4.42 M/UL — SIGNIFICANT CHANGE UP (ref 3.8–5.2)
RBC # FLD: 16.7 % — HIGH (ref 10.3–14.5)
SODIUM SERPL-SCNC: 138 MMOL/L — SIGNIFICANT CHANGE UP (ref 135–145)
TIBC SERPL-MCNC: 311 UG/DL — SIGNIFICANT CHANGE UP (ref 220–430)
UIBC SERPL-MCNC: 222 UG/DL — SIGNIFICANT CHANGE UP (ref 110–370)
WBC # BLD: 5.42 K/UL — SIGNIFICANT CHANGE UP (ref 3.8–10.5)
WBC # FLD AUTO: 5.42 K/UL — SIGNIFICANT CHANGE UP (ref 3.8–10.5)

## 2024-05-20 PROCEDURE — 82607 VITAMIN B-12: CPT

## 2024-05-20 PROCEDURE — 85018 HEMOGLOBIN: CPT

## 2024-05-20 PROCEDURE — 82435 ASSAY OF BLOOD CHLORIDE: CPT

## 2024-05-20 PROCEDURE — 80048 BASIC METABOLIC PNL TOTAL CA: CPT

## 2024-05-20 PROCEDURE — 99285 EMERGENCY DEPT VISIT HI MDM: CPT

## 2024-05-20 PROCEDURE — 84295 ASSAY OF SERUM SODIUM: CPT

## 2024-05-20 PROCEDURE — 84100 ASSAY OF PHOSPHORUS: CPT

## 2024-05-20 PROCEDURE — 85027 COMPLETE CBC AUTOMATED: CPT

## 2024-05-20 PROCEDURE — 82803 BLOOD GASES ANY COMBINATION: CPT

## 2024-05-20 PROCEDURE — 82746 ASSAY OF FOLIC ACID SERUM: CPT

## 2024-05-20 PROCEDURE — 83690 ASSAY OF LIPASE: CPT

## 2024-05-20 PROCEDURE — 82272 OCCULT BLD FECES 1-3 TESTS: CPT

## 2024-05-20 PROCEDURE — 36415 COLL VENOUS BLD VENIPUNCTURE: CPT

## 2024-05-20 PROCEDURE — 83540 ASSAY OF IRON: CPT

## 2024-05-20 PROCEDURE — 87507 IADNA-DNA/RNA PROBE TQ 12-25: CPT

## 2024-05-20 PROCEDURE — 83735 ASSAY OF MAGNESIUM: CPT

## 2024-05-20 PROCEDURE — 83550 IRON BINDING TEST: CPT

## 2024-05-20 PROCEDURE — 85014 HEMATOCRIT: CPT

## 2024-05-20 PROCEDURE — 84443 ASSAY THYROID STIM HORMONE: CPT

## 2024-05-20 PROCEDURE — 82947 ASSAY GLUCOSE BLOOD QUANT: CPT

## 2024-05-20 PROCEDURE — 74177 CT ABD & PELVIS W/CONTRAST: CPT | Mod: MC

## 2024-05-20 PROCEDURE — 85730 THROMBOPLASTIN TIME PARTIAL: CPT

## 2024-05-20 PROCEDURE — 85610 PROTHROMBIN TIME: CPT

## 2024-05-20 PROCEDURE — 82330 ASSAY OF CALCIUM: CPT

## 2024-05-20 PROCEDURE — 80053 COMPREHEN METABOLIC PANEL: CPT

## 2024-05-20 PROCEDURE — 83605 ASSAY OF LACTIC ACID: CPT

## 2024-05-20 PROCEDURE — 87086 URINE CULTURE/COLONY COUNT: CPT

## 2024-05-20 PROCEDURE — 82728 ASSAY OF FERRITIN: CPT

## 2024-05-20 PROCEDURE — 81001 URINALYSIS AUTO W/SCOPE: CPT

## 2024-05-20 PROCEDURE — 84132 ASSAY OF SERUM POTASSIUM: CPT

## 2024-05-20 PROCEDURE — 85025 COMPLETE CBC W/AUTO DIFF WBC: CPT

## 2024-05-20 RX ORDER — CIPROFLOXACIN LACTATE 400MG/40ML
1 VIAL (ML) INTRAVENOUS
Qty: 6 | Refills: 0
Start: 2024-05-20 | End: 2024-05-22

## 2024-05-20 RX ORDER — SIMVASTATIN 20 MG/1
1 TABLET, FILM COATED ORAL
Refills: 0 | DISCHARGE

## 2024-05-20 RX ORDER — METRONIDAZOLE 500 MG
1 TABLET ORAL
Qty: 9 | Refills: 0
Start: 2024-05-20 | End: 2024-05-22

## 2024-05-20 RX ORDER — LACTOBACILLUS ACIDOPHILUS 100MM CELL
1 CAPSULE ORAL
Qty: 90 | Refills: 0
Start: 2024-05-20 | End: 2024-06-18

## 2024-05-20 RX ORDER — SIMVASTATIN 20 MG/1
1 TABLET, FILM COATED ORAL
Qty: 0 | Refills: 0 | DISCHARGE
Start: 2024-05-20

## 2024-05-20 RX ORDER — LATANOPROST 0.05 MG/ML
1 SOLUTION/ DROPS OPHTHALMIC; TOPICAL
Refills: 0 | DISCHARGE

## 2024-05-20 RX ORDER — LATANOPROST 0.05 MG/ML
1 SOLUTION/ DROPS OPHTHALMIC; TOPICAL
Qty: 0 | Refills: 0 | DISCHARGE
Start: 2024-05-20

## 2024-05-20 RX ADMIN — Medication 37.5 MICROGRAM(S): at 05:15

## 2024-05-20 RX ADMIN — Medication 100 MILLIGRAM(S): at 05:17

## 2024-05-20 RX ADMIN — Medication 1 TABLET(S): at 08:23

## 2024-05-20 RX ADMIN — Medication 200 MILLIGRAM(S): at 06:21

## 2024-05-20 RX ADMIN — Medication 1 TABLET(S): at 11:06

## 2024-05-20 NOTE — DISCHARGE NOTE PROVIDER - NSDCFUADDAPPT_GEN_ALL_CORE_FT
APPTS ARE READY TO BE MADE: [ ] YES    Best Family or Patient Contact (if needed):    Additional Information about above appointments (if needed):    1: pcp  2: gi  3:     Other comments or requests:

## 2024-05-20 NOTE — PROGRESS NOTE ADULT - ASSESSMENT
colitis  rectal bleed    gi pcr negative  reg diet  cont cipro/flagyl for 5 days  outpatient colonoscopy  she would like to follow with dr. JAGDEEP bush  no objection to dc    I reviewed the overnight course of events on the unit, re-confirming the patient history. I discussed the care with the patient and their family  The plan of care was discussed with the physician assistant and modifications were made to the notation where appropriate.   Differential diagnosis and plan of care discussed with patient after the evaluation  35 minutes spent on total encounter of which more than fifty percent of the encounter was spent counseling and/or coordinating care by the attending physician.  Advanced care planning was discussed with patient and family.  Advanced care planning forms were reviewed and discussed.  Risks, benefits and alternatives of gastroenterologic procedures were discussed in detail and all questions were answered.

## 2024-05-20 NOTE — PROGRESS NOTE ADULT - SUBJECTIVE AND OBJECTIVE BOX
DATE OF SERVICE: 05-20-24 @ 15:05    Patient is a 69y old  Female who presents with a chief complaint of bloody stool (20 May 2024 13:07)      SUBJECTIVE / OVERNIGHT EVENTS:  No chest pain. No shortness of breath. No complaints. No events overnight. v    MEDICATIONS  (STANDING):  ciprofloxacin   IVPB 400 milliGRAM(s) IV Intermittent every 12 hours  lactobacillus acidophilus 1 Tablet(s) Oral three times a day with meals  latanoprost 0.005% Ophthalmic Solution 1 Drop(s) Both EYES at bedtime  levothyroxine 37.5 MICROGram(s) Oral daily  metroNIDAZOLE  IVPB 500 milliGRAM(s) IV Intermittent every 8 hours  simvastatin 10 milliGRAM(s) Oral at bedtime    MEDICATIONS  (PRN):      Vital Signs Last 24 Hrs  T(C): 36.4 (20 May 2024 12:46), Max: 36.9 (19 May 2024 15:39)  T(F): 97.6 (20 May 2024 12:46), Max: 98.4 (19 May 2024 15:39)  HR: 57 (20 May 2024 12:46) (57 - 75)  BP: 130/79 (20 May 2024 12:46) (129/79 - 148/72)  BP(mean): --  RR: 18 (20 May 2024 12:46) (18 - 18)  SpO2: 100% (20 May 2024 12:46) (98% - 100%)    Parameters below as of 20 May 2024 12:46  Patient On (Oxygen Delivery Method): room air      CAPILLARY BLOOD GLUCOSE        I&O's Summary    19 May 2024 07:01  -  20 May 2024 07:00  --------------------------------------------------------  IN: 560 mL / OUT: 0 mL / NET: 560 mL    20 May 2024 07:01  -  20 May 2024 15:05  --------------------------------------------------------  IN: 480 mL / OUT: 0 mL / NET: 480 mL        PHYSICAL EXAM:  GENERAL: NAD, well-developed  HEAD:  Atraumatic, Normocephalic  EYES: EOMI, PERRLA, conjunctiva and sclera clear  NECK: Supple, No JVD  CHEST/LUNG: Clear to auscultation bilaterally; No wheeze  HEART: Regular rate and rhythm; No murmurs, rubs, or gallops  ABDOMEN: Soft, Nontender, Nondistended; Bowel sounds present  EXTREMITIES:  2+ Peripheral Pulses, No clubbing, cyanosis, or edema  PSYCH: AAOx3  NEUROLOGY: non-focal  SKIN: No rashes or lesions    LABS:                        9.0    5.42  )-----------( 116      ( 20 May 2024 08:00 )             29.0     05-20    138  |  103  |  15  ----------------------------<  91  3.8   |  24  |  0.59    Ca    9.0      20 May 2024 08:02  Phos  3.5     05-19  Mg     2.0     05-19            Urinalysis Basic - ( 20 May 2024 08:02 )    Color: x / Appearance: x / SG: x / pH: x  Gluc: 91 mg/dL / Ketone: x  / Bili: x / Urobili: x   Blood: x / Protein: x / Nitrite: x   Leuk Esterase: x / RBC: x / WBC x   Sq Epi: x / Non Sq Epi: x / Bacteria: x        RADIOLOGY & ADDITIONAL TESTS:    Imaging Personally Reviewed:    Consultant(s) Notes Reviewed:      Care Discussed with Consultants/Other Providers:  
DATE OF SERVICE: 05-19-24 @ 10:05    Patient is a 69y old  Female who presents with a chief complaint of bloody stool (18 May 2024 15:05)      SUBJECTIVE / OVERNIGHT EVENTS:  No chest pain. No shortness of breath. No complaints. No events overnight.     MEDICATIONS  (STANDING):  ciprofloxacin   IVPB 400 milliGRAM(s) IV Intermittent every 12 hours  levothyroxine 37.5 MICROGram(s) Oral daily  metroNIDAZOLE  IVPB 500 milliGRAM(s) IV Intermittent every 8 hours    MEDICATIONS  (PRN):      Vital Signs Last 24 Hrs  T(C): 37.1 (18 May 2024 21:18), Max: 37.1 (18 May 2024 21:18)  T(F): 98.8 (18 May 2024 21:18), Max: 98.8 (18 May 2024 21:18)  HR: 64 (18 May 2024 21:18) (64 - 66)  BP: 101/67 (18 May 2024 21:18) (101/67 - 117/68)  BP(mean): 86 (18 May 2024 18:30) (84 - 86)  RR: 18 (18 May 2024 21:18) (18 - 18)  SpO2: 96% (18 May 2024 21:18) (95% - 100%)    Parameters below as of 18 May 2024 21:18  Patient On (Oxygen Delivery Method): room air      CAPILLARY BLOOD GLUCOSE        I&O's Summary      PHYSICAL EXAM:  GENERAL: NAD, well-developed  HEAD:  Atraumatic, Normocephalic  EYES: EOMI, PERRLA, conjunctiva and sclera clear  NECK: Supple, No JVD  CHEST/LUNG: Clear to auscultation bilaterally; No wheeze  HEART: Regular rate and rhythm; No murmurs, rubs, or gallops  ABDOMEN: Soft, Nontender, Nondistended; Bowel sounds present  EXTREMITIES:  2+ Peripheral Pulses, No clubbing, cyanosis, or edema  PSYCH: AAOx3  NEUROLOGY: non-focal  SKIN: No rashes or lesions    LABS:                        9.6    6.08  )-----------( 120      ( 19 May 2024 07:22 )             31.5     05-19    143  |  104  |  13  ----------------------------<  99  3.5   |  24  |  0.64    Ca    9.2      19 May 2024 07:22  Phos  3.5     05-19  Mg     2.0     05-19    TPro  7.1  /  Alb  4.2  /  TBili  0.4  /  DBili  x   /  AST  13  /  ALT  19  /  AlkPhos  79  05-18    PT/INR - ( 18 May 2024 10:01 )   PT: 10.9 sec;   INR: 1.04 ratio         PTT - ( 18 May 2024 10:01 )  PTT:31.6 sec      Urinalysis Basic - ( 19 May 2024 07:22 )    Color: x / Appearance: x / SG: x / pH: x  Gluc: 99 mg/dL / Ketone: x  / Bili: x / Urobili: x   Blood: x / Protein: x / Nitrite: x   Leuk Esterase: x / RBC: x / WBC x   Sq Epi: x / Non Sq Epi: x / Bacteria: x        RADIOLOGY & ADDITIONAL TESTS:    Imaging Personally Reviewed:    Consultant(s) Notes Reviewed:      Care Discussed with Consultants/Other Providers:  
[facility]  Wander Baldwin PA-C  [address] [place] [address]  [phone]      INTERVAL HPI/OVERNIGHT EVENTS:    patient s/e  feels alot better  tolerating diet     MEDICATIONS  (STANDING):  ciprofloxacin   IVPB 400 milliGRAM(s) IV Intermittent every 12 hours  lactobacillus acidophilus 1 Tablet(s) Oral three times a day with meals  latanoprost 0.005% Ophthalmic Solution 1 Drop(s) Both EYES at bedtime  levothyroxine 37.5 MICROGram(s) Oral daily  metroNIDAZOLE  IVPB 500 milliGRAM(s) IV Intermittent every 8 hours  simvastatin 10 milliGRAM(s) Oral at bedtime    MEDICATIONS  (PRN):      Allergies    No Known Allergies    Intolerances        ROS:   General:  No  fevers, chills, night sweats, fatigue,   Eyes:  Good vision, no reported pain  ENT:  No sore throat, pain, runny nose, dysphagia  CV:  No pain, palpitations, hypo/hypertension  Resp:  No dyspnea, cough, tachypnea, wheezing  GI:  No pain, No nausea, No vomiting, No diarrhea, No constipation, No weight loss, No fever, No pruritis, No rectal bleeding, No tarry stools, No dysphagia,  :  No pain, bleeding, incontinence, nocturia  Muscle:  No pain, weakness  Neuro:  No weakness, tingling, memory problems  Psych:  No fatigue, insomnia, mood problems, depression  Endocrine:  No polyuria, polydipsia, cold/heat intolerance  Heme:  No petechiae, ecchymosis, easy bruisability  Skin:  No rash, tattoos, scars, edema      PHYSICAL EXAM:   Vital Signs:  Vital Signs Last 24 Hrs  T(C): 36.5 (20 May 2024 04:40), Max: 36.9 (19 May 2024 15:39)  T(F): 97.7 (20 May 2024 04:40), Max: 98.4 (19 May 2024 15:39)  HR: 65 (20 May 2024 04:40) (60 - 75)  BP: 131/60 (20 May 2024 04:40) (120/71 - 148/72)  BP(mean): --  RR: 18 (20 May 2024 04:40) (18 - 18)  SpO2: 98% (20 May 2024 04:40) (98% - 99%)    Parameters below as of 20 May 2024 04:40  Patient On (Oxygen Delivery Method): room air      Daily     Daily     GENERAL:  Appears stated age,   HEENT:  NC/AT,    CHEST:  Full & symmetric excursion,   HEART:  Regular rhythm,  ABDOMEN:  Soft, non-tender, non-distended,  EXTEREMITIES:  no cyanosis  SKIN:  No rash  NEURO:  Alert,       LABS:                        9.0    5.42  )-----------( 116      ( 20 May 2024 08:00 )             29.0     05-20    138  |  103  |  15  ----------------------------<  91  3.8   |  24  |  0.59    Ca    9.0      20 May 2024 08:02  Phos  3.5     05-19  Mg     2.0     05-19        Urinalysis Basic - ( 20 May 2024 08:02 )    Color: x / Appearance: x / SG: x / pH: x  Gluc: 91 mg/dL / Ketone: x  / Bili: x / Urobili: x   Blood: x / Protein: x / Nitrite: x   Leuk Esterase: x / RBC: x / WBC x   Sq Epi: x / Non Sq Epi: x / Bacteria: x        RADIOLOGY & ADDITIONAL TESTS:

## 2024-05-20 NOTE — DISCHARGE NOTE PROVIDER - CARE PROVIDER_API CALL
Alisa Sim  Internal Medicine  [address], [address]  [place] [address]  Phone: [phone]  Fax: [phone]  Follow Up Time: Routine    Jevon Mantilla  Gastroenterology  [address], [address]  [place] [address]  Phone: [phone]  Fax: [phone]  Follow Up Time: 1 month

## 2024-05-20 NOTE — DISCHARGE NOTE PROVIDER - CARE PROVIDERS DIRECT ADDRESSES
,DirectAddress_Unknown,[email or web address]

## 2024-05-20 NOTE — DISCHARGE NOTE NURSING/CASE MANAGEMENT/SOCIAL WORK - PATIENT PORTAL LINK FT
You can access the FollowMyHealth Patient Portal offered by [facility] by registering at the following website: [email or web address]. By joining [employer]’s FollowMyHealth portal, you will also be able to view your health information using other applications (apps) compatible with our system.

## 2024-05-20 NOTE — DISCHARGE NOTE PROVIDER - NSDCMRMEDTOKEN_GEN_ALL_CORE_FT
Cipro 500 mg oral tablet: 1 tab(s) orally 2 times a day  enalapril-hydrochlorothiazide 5 mg-12.5 mg oral tablet: 1 tab(s) orally once a day  lactobacillus acidophilus oral capsule: 1 tab(s) orally 3 times a day  latanoprost 0.005% ophthalmic solution: 1 drop(s) to each affected eye once a day (at bedtime)  levothyroxine 75 mcg (0.075 mg) oral tablet: 0.5 tab(s) orally once a day  metroNIDAZOLE 500 mg oral tablet: 1 tab(s) orally 3 times a day  otc supplement(s): vitamin d3 / calcium  simvastatin 10 mg oral tablet: 1 tab(s) orally once a day (at bedtime)

## 2024-05-20 NOTE — DISCHARGE NOTE PROVIDER - PROVIDER TOKENS
PROVIDER:[TOKEN:[[id number]],FOLLOWUP:[Routine]],PROVIDER:[TOKEN:[[id number]],FOLLOWUP:[1 month]]

## 2024-05-20 NOTE — DISCHARGE NOTE PROVIDER - HOSPITAL COURSE
HPI:  69y f pmh HTN, HLD, Hypothyrodism, Pt comes to ed c/o Diarrhea several episodes 3d ago. With mild abd cramps. Had improved and today had formed bm today w blood on stool. No blood in bowl. No fever, nvdc, cough,sob,cp, no ill contacts,habits, abd surgery. Had colonoscopy 10y ago reported normal. No ill contacts. (18 May 2024 15:05)    Hospital Course:  Pt was treated for colitis, was found to have diarrhea with rectal bleed, +FOBT, gi pcr negative  Pt being followed by GI, tolerated reg diet, cont cipro/flagyl for total 5 days. Bacid 1 tab po tid added   Pt will outpatient colonoscopy 4 to 6 weeks with dr. JAGDEEP bush    Important Medication Changes and Reason:  none  Active or Pending Issues Requiring Follow-up:  Colitis   Advanced Directives:   [ x] Full code  [ ] DNR  [ ] Hospice    Discharge Diagnoses:  Colitis  Htn    Discharge/Dispo/Med rec discussed with attending Dr. Ibarra Patient medically cleared for discharge home with outpatient follow up with PCP,GI.

## 2024-05-20 NOTE — PROGRESS NOTE ADULT - ASSESSMENT
69y f pmh HTN, HLD, Hypothyrodism, Pt comes to ed c/o Diarrhea several episodes 3d ago. With mild abd cramps. Had improved and today had formed bm today w blood on stool. No blood in bowl. No fever, nvdc, cough,sob,cp, no ill contacts,habits, abd surgery. Had colonoscopy 10y ago reported normal. No ill contacts.    colitis infectious vs ischemic  - regular diet  - stool for GI PCR  -  cipro and flagyl total 5 days  - GI consult    anemia  - iron studies not iron def    HTN  - c/w home meds    hypothyroid  - c/w synthroid  -  TSH 6    discharge and follow up with GI and PCP

## 2024-05-20 NOTE — DISCHARGE NOTE PROVIDER - NSDCCPCAREPLAN_GEN_ALL_CORE_FT
PRINCIPAL DISCHARGE DIAGNOSIS  Diagnosis: Colitis  Assessment and Plan of Treatment: Pt was treated for colitis, was found to have diarrhea with rectal bleed, +FOBT, gi pcr negative  Pt being followed by GI, tolerated reg diet, cont cipro/flagyl for total 5 days. Bacid 1 tab po tid added   Pt will outpatient colonoscopy 4 to 6 weeks with dr. JAGDEEP bush

## 2025-06-03 NOTE — PATIENT PROFILE ADULT - BILL PAYMENT
LOV: 4/28/2025-- seen for irregular cycles    Messaged today c/o prolonged bleeding since 5/22  Also reports bleeding had been light but she has been having a lot of pain    RN inquired about how much pt is bleeding and if tylenol/ ibuprofen has been tried for pain    Encounter open for response    
Pt reports changing pad x2/day  Had pelvic US on 5/30 to assess for PCOS    RN scheduled pt for OV to discuss US results and concerns with prolonged bleeding    Encounter closed  
no